# Patient Record
Sex: FEMALE | Race: WHITE | URBAN - METROPOLITAN AREA
[De-identification: names, ages, dates, MRNs, and addresses within clinical notes are randomized per-mention and may not be internally consistent; named-entity substitution may affect disease eponyms.]

---

## 2018-06-14 ENCOUNTER — OFFICE VISIT (OUTPATIENT)
Dept: URBAN - METROPOLITAN AREA CLINIC 81 | Facility: CLINIC | Age: 63
End: 2018-06-14
Payer: COMMERCIAL

## 2018-06-14 DIAGNOSIS — H52.13 MYOPIA, BILATERAL: Primary | ICD-10-CM

## 2018-06-14 DIAGNOSIS — H40.013 OPEN ANGLE WITH BORDERLINE FINDINGS, LOW RISK, BILATERAL: ICD-10-CM

## 2018-06-14 PROCEDURE — 92015 DETERMINE REFRACTIVE STATE: CPT | Performed by: OPTOMETRIST

## 2018-06-14 PROCEDURE — 92012 INTRM OPH EXAM EST PATIENT: CPT | Performed by: OPTOMETRIST

## 2018-06-14 ASSESSMENT — VISUAL ACUITY
OS: 20/50
OD: 20/50

## 2018-06-14 ASSESSMENT — KERATOMETRY
OS: 42.88
OD: 43.38

## 2018-06-14 NOTE — IMPRESSION/PLAN
Impression: Myopia, bilateral: H52.13. Plan: Discussed Dx with pt.  Rec hold off on new Rx pending results of Cat eval. Although pt dose want supply of CL's to hold until cat consult

## 2018-06-14 NOTE — IMPRESSION/PLAN
Impression: Open angle with borderline findings, low risk, bilateral: H40.013. Large c/d Plan: Continue to monitor. Baseline tests after cat consult/surgery.

## 2018-06-14 NOTE — IMPRESSION/PLAN
Impression: Age-related nuclear cataract, bilateral: H25.13. Plan: Discussed diagnosis & treatment options with patient. Hold off on new gls, pt aware new glasses will not significantly improve vision. Pt understands and wishes to proceed with surgery. Pt needs DE. Recommend CEIOL OD then OS. STANDARD IOL. Target: Distance. RL2. Schedule ASCAN then Pre Op with Dr. Octavia Blair.

## 2019-08-09 ENCOUNTER — OFFICE VISIT (OUTPATIENT)
Dept: URBAN - METROPOLITAN AREA CLINIC 76 | Facility: CLINIC | Age: 64
End: 2019-08-09
Payer: COMMERCIAL

## 2019-08-09 PROCEDURE — 92310 CONTACT LENS FITTING OU: CPT | Performed by: OPTOMETRIST

## 2019-08-09 PROCEDURE — 92014 COMPRE OPH EXAM EST PT 1/>: CPT | Performed by: OPTOMETRIST

## 2019-08-09 ASSESSMENT — VISUAL ACUITY
OD: 20/70-
OS: 20/50+

## 2019-08-09 ASSESSMENT — INTRAOCULAR PRESSURE
OD: 14
OS: 16

## 2019-08-09 ASSESSMENT — KERATOMETRY
OD: 43.50
OS: 43.13

## 2019-08-09 NOTE — IMPRESSION/PLAN
Impression: Open angle with borderline findings, low risk, bilateral: H40.013. Plan: Discussed diagnosis in detail with patient. Recommend baseline glaucoma testing. VF, OCT, P[place].

## 2019-08-09 NOTE — IMPRESSION/PLAN
Impression: Age-related nuclear cataract, bilateral: H25.13. Plan: Cataracts account for the patient's complaints. Patient understands changing glasses will not improve vision.   Recommend perla sanchez/ Dr. Octavia Blair for cataract surgery, after glc testing is done

## 2019-08-09 NOTE — IMPRESSION/PLAN
Impression: Presbyopia: H52.4. Plan: Discussed diagnosis with patient. Dispensed new MRx today. Order ctls trials. If pt happy with comfort and vision w/ ctls, ok to finalize. Pt to call with any concerns.

## 2019-09-20 ENCOUNTER — OFFICE VISIT (OUTPATIENT)
Dept: URBAN - METROPOLITAN AREA CLINIC 76 | Facility: CLINIC | Age: 64
End: 2019-09-20
Payer: COMMERCIAL

## 2019-09-20 PROCEDURE — 99213 OFFICE O/P EST LOW 20 MIN: CPT | Performed by: OPTOMETRIST

## 2019-09-20 PROCEDURE — 92083 EXTENDED VISUAL FIELD XM: CPT | Performed by: OPTOMETRIST

## 2019-09-20 PROCEDURE — 76514 ECHO EXAM OF EYE THICKNESS: CPT | Performed by: OPTOMETRIST

## 2019-09-20 PROCEDURE — 92133 CPTRZD OPH DX IMG PST SGM ON: CPT | Performed by: OPTOMETRIST

## 2019-09-20 RX ORDER — LATANOPROST 50 UG/ML
0.005 % SOLUTION OPHTHALMIC
Qty: 7.5 | Refills: 3 | Status: INACTIVE
Start: 2019-09-20 | End: 2019-10-04

## 2019-09-20 ASSESSMENT — INTRAOCULAR PRESSURE
OD: 14
OS: 14

## 2019-09-20 NOTE — IMPRESSION/PLAN
Impression: Primary open-angle glaucoma, bilateral, mild stage: H40.1131. VF: Normal OU 
OCT: Mod NFL thinning OU Plan: Discussed dx and test results in detail with patient. Recommend starting Latanoprost QHS OU.

## 2019-09-20 NOTE — IMPRESSION/PLAN
Impression: Age-related nuclear cataract, bilateral: H25.13. Plan: Cataracts account for the patient's complaints. Patient understands changing glasses will not improve vision.   Recommend pelra sanchez/ Dr. Fernie Stanton (per patients request)  for cataract surgery, after IOP controlled

## 2019-10-04 ENCOUNTER — OFFICE VISIT (OUTPATIENT)
Dept: URBAN - METROPOLITAN AREA CLINIC 76 | Facility: CLINIC | Age: 64
End: 2019-10-04
Payer: COMMERCIAL

## 2019-10-04 DIAGNOSIS — H25.13 AGE-RELATED NUCLEAR CATARACT, BILATERAL: ICD-10-CM

## 2019-10-04 DIAGNOSIS — H40.1131 PRIMARY OPEN-ANGLE GLAUCOMA, BILATERAL, MILD STAGE: Primary | ICD-10-CM

## 2019-10-04 PROCEDURE — 99213 OFFICE O/P EST LOW 20 MIN: CPT | Performed by: OPTOMETRIST

## 2019-10-04 RX ORDER — LATANOPROST 50 UG/ML
0.005 % SOLUTION OPHTHALMIC
Qty: 7.5 | Refills: 3 | Status: INACTIVE
Start: 2019-10-04 | End: 2020-07-24

## 2019-10-04 ASSESSMENT — INTRAOCULAR PRESSURE
OS: 13
OD: 13

## 2019-10-04 NOTE — IMPRESSION/PLAN
Impression: Age-related nuclear cataract, bilateral: H25.13. Plan: Cataracts account for the patient's complaints. Patient understands changing glasses will not improve vision.   Recommend perla sanchez/ Dr. Nelly Malone for cataract surgery, per patient request.

## 2019-10-04 NOTE — IMPRESSION/PLAN
Impression: Primary open-angle glaucoma, bilateral, mild stage: H40.1131. VF: Normal OU 
OCT: Mod NFL thinning OU Plan: Discussed dx in detail with patient. Continue Latanoprost QHS OU.  Continue to monitor

## 2019-10-30 ENCOUNTER — NEW PATIENT (OUTPATIENT)
Dept: URBAN - METROPOLITAN AREA CLINIC 80 | Facility: CLINIC | Age: 64
End: 2019-10-30
Payer: COMMERCIAL

## 2019-10-30 DIAGNOSIS — H16.223 KERATOCONJUNCTIVITIS SICCA, BILATERAL: ICD-10-CM

## 2019-10-30 DIAGNOSIS — H25.813 COMBINED FORMS OF AGE-RELATED CATARACT, BILATERAL: Primary | ICD-10-CM

## 2019-10-30 DIAGNOSIS — H52.223 REGULAR ASTIGMATISM, BILATERAL: ICD-10-CM

## 2019-10-30 PROCEDURE — 92004 COMPRE OPH EXAM NEW PT 1/>: CPT | Performed by: OPHTHALMOLOGY

## 2019-10-30 ASSESSMENT — VISUAL ACUITY
OS: 20/40
OD: 20/60

## 2019-10-30 ASSESSMENT — INTRAOCULAR PRESSURE
OS: 16
OD: 13

## 2019-11-26 ENCOUNTER — Encounter (OUTPATIENT)
Dept: URBAN - METROPOLITAN AREA CLINIC 64 | Facility: CLINIC | Age: 64
End: 2019-11-26
Payer: COMMERCIAL

## 2019-12-02 ENCOUNTER — Encounter (OUTPATIENT)
Dept: URBAN - METROPOLITAN AREA CLINIC 80 | Facility: CLINIC | Age: 64
End: 2019-12-02
Payer: COMMERCIAL

## 2019-12-02 PROCEDURE — 99213 OFFICE O/P EST LOW 20 MIN: CPT | Performed by: PHYSICIAN ASSISTANT

## 2019-12-05 ENCOUNTER — SURGERY (OUTPATIENT)
Dept: URBAN - METROPOLITAN AREA SURGERY 50 | Facility: SURGERY | Age: 64
End: 2019-12-05
Payer: COMMERCIAL

## 2019-12-05 PROCEDURE — 0191T INSERTION OF ANTERIOR SEGMENT AQUEOUS DRAINAGE DEVICE, W/OUT EXTRAOCULAR RESERVO: CPT | Performed by: OPHTHALMOLOGY

## 2019-12-06 ENCOUNTER — POST-OPERATIVE VISIT (OUTPATIENT)
Dept: URBAN - METROPOLITAN AREA CLINIC 76 | Facility: CLINIC | Age: 64
End: 2019-12-06
Payer: COMMERCIAL

## 2019-12-06 PROCEDURE — 99024 POSTOP FOLLOW-UP VISIT: CPT | Performed by: OPTOMETRIST

## 2019-12-06 ASSESSMENT — INTRAOCULAR PRESSURE: OD: 11

## 2019-12-13 ENCOUNTER — POST-OPERATIVE VISIT (OUTPATIENT)
Dept: URBAN - METROPOLITAN AREA CLINIC 76 | Facility: CLINIC | Age: 64
End: 2019-12-13
Payer: COMMERCIAL

## 2019-12-13 PROCEDURE — 99024 POSTOP FOLLOW-UP VISIT: CPT | Performed by: OPTOMETRIST

## 2019-12-13 ASSESSMENT — INTRAOCULAR PRESSURE: OD: 15

## 2019-12-13 ASSESSMENT — VISUAL ACUITY: OD: 20/25+

## 2020-01-13 ENCOUNTER — Encounter (OUTPATIENT)
Dept: URBAN - METROPOLITAN AREA CLINIC 80 | Facility: CLINIC | Age: 65
End: 2020-01-13
Payer: COMMERCIAL

## 2020-01-13 DIAGNOSIS — Z01.818 ENCOUNTER FOR OTHER PREPROCEDURAL EXAMINATION: Primary | ICD-10-CM

## 2020-01-13 PROCEDURE — 99213 OFFICE O/P EST LOW 20 MIN: CPT | Performed by: PHYSICIAN ASSISTANT

## 2020-01-16 ENCOUNTER — SURGERY (OUTPATIENT)
Dept: URBAN - METROPOLITAN AREA SURGERY 50 | Facility: SURGERY | Age: 65
End: 2020-01-16
Payer: COMMERCIAL

## 2020-01-16 PROCEDURE — 0191T INSERTION OF ANTERIOR SEGMENT AQUEOUS DRAINAGE DEVICE, W/OUT EXTRAOCULAR RESERVO: CPT | Performed by: OPHTHALMOLOGY

## 2020-01-17 ENCOUNTER — POST-OPERATIVE VISIT (OUTPATIENT)
Dept: URBAN - METROPOLITAN AREA CLINIC 76 | Facility: CLINIC | Age: 65
End: 2020-01-17
Payer: COMMERCIAL

## 2020-01-17 PROCEDURE — 99024 POSTOP FOLLOW-UP VISIT: CPT | Performed by: OPTOMETRIST

## 2020-01-17 ASSESSMENT — INTRAOCULAR PRESSURE
OS: 15
OD: 13

## 2020-01-24 ENCOUNTER — POST-OPERATIVE VISIT (OUTPATIENT)
Dept: URBAN - METROPOLITAN AREA CLINIC 76 | Facility: CLINIC | Age: 65
End: 2020-01-24
Payer: COMMERCIAL

## 2020-01-24 DIAGNOSIS — Z09 ENCNTR FOR F/U EXAM AFT TRTMT FOR COND OTH THAN MALIG NEOPLM: Primary | ICD-10-CM

## 2020-01-24 PROCEDURE — 99024 POSTOP FOLLOW-UP VISIT: CPT | Performed by: OPTOMETRIST

## 2020-01-24 ASSESSMENT — INTRAOCULAR PRESSURE
OD: 15
OS: 15

## 2020-01-24 ASSESSMENT — VISUAL ACUITY
OD: 20/25+
OS: 20/25-

## 2020-02-28 ENCOUNTER — POST-OPERATIVE VISIT (OUTPATIENT)
Dept: URBAN - METROPOLITAN AREA CLINIC 76 | Facility: CLINIC | Age: 65
End: 2020-02-28
Payer: COMMERCIAL

## 2020-02-28 DIAGNOSIS — Z96.1 PRESENCE OF INTRAOCULAR LENS: Primary | ICD-10-CM

## 2020-02-28 PROCEDURE — 99024 POSTOP FOLLOW-UP VISIT: CPT | Performed by: OPTOMETRIST

## 2020-02-28 ASSESSMENT — INTRAOCULAR PRESSURE
OS: 15
OD: 15

## 2020-02-28 NOTE — IMPRESSION/PLAN
Impression: S/P CE/IOL LRI ORA OMNI OS - 43 Days. Presence of intraocular lens  Z96.1. Plan: Continue to monitor IOP without treatment at this time. New MRX given today. --Advised patient to use artificial tears for comfort.

## 2020-07-24 ENCOUNTER — OFFICE VISIT (OUTPATIENT)
Dept: URBAN - METROPOLITAN AREA CLINIC 76 | Facility: CLINIC | Age: 65
End: 2020-07-24
Payer: COMMERCIAL

## 2020-07-24 PROCEDURE — 99213 OFFICE O/P EST LOW 20 MIN: CPT | Performed by: OPTOMETRIST

## 2020-07-24 ASSESSMENT — INTRAOCULAR PRESSURE
OD: 14
OS: 15

## 2020-07-24 NOTE — IMPRESSION/PLAN
Impression: Primary open-angle glaucoma, bilateral, mild stage: H40.1131. Plan: Discussed dx in detail with patient. Continue with no treatment at this time. Testing due in September.

## 2021-12-03 ENCOUNTER — OFFICE VISIT (OUTPATIENT)
Dept: URBAN - METROPOLITAN AREA CLINIC 76 | Facility: CLINIC | Age: 66
End: 2021-12-03
Payer: COMMERCIAL

## 2021-12-03 DIAGNOSIS — H52.4 PRESBYOPIA: Primary | ICD-10-CM

## 2021-12-03 PROCEDURE — 92012 INTRM OPH EXAM EST PATIENT: CPT | Performed by: OPTOMETRIST

## 2021-12-03 ASSESSMENT — VISUAL ACUITY
OD: 20/25
OS: 20/20

## 2021-12-03 ASSESSMENT — KERATOMETRY
OS: 43.00
OD: 43.63

## 2021-12-03 ASSESSMENT — INTRAOCULAR PRESSURE
OS: 15
OD: 14

## 2021-12-03 NOTE — IMPRESSION/PLAN
Impression: Primary open-angle glaucoma, bilateral, mild stage: H40.1131. Pt declined dilation and did not return when advised. + Martita 83 (mother). OMNI OS & iStent OD w/ CE. Plan: Discussed dx in detail with patient. Continue with no treatment at this time, advised of vision loss without proper monitoring.

## 2022-10-25 ENCOUNTER — OFFICE VISIT (OUTPATIENT)
Dept: URBAN - METROPOLITAN AREA CLINIC 76 | Facility: CLINIC | Age: 67
End: 2022-10-25
Payer: MEDICARE

## 2022-10-25 DIAGNOSIS — H26.493 OTHER SECONDARY CATARACT, BILATERAL: Primary | ICD-10-CM

## 2022-10-25 DIAGNOSIS — H40.1131 PRIMARY OPEN-ANGLE GLAUCOMA, BILATERAL, MILD STAGE: ICD-10-CM

## 2022-10-25 PROCEDURE — 92014 COMPRE OPH EXAM EST PT 1/>: CPT | Performed by: OPTOMETRIST

## 2022-10-25 ASSESSMENT — KERATOMETRY
OS: 43.13
OD: 43.88

## 2022-10-25 ASSESSMENT — VISUAL ACUITY
OS: 20/30
OD: 20/30

## 2022-10-25 ASSESSMENT — INTRAOCULAR PRESSURE
OS: 14
OD: 14

## 2022-10-25 NOTE — IMPRESSION/PLAN
Impression: Primary open-angle glaucoma, bilateral, mild stage: H40.1131. pt was taking latanoprost. Stopped after surgery. Also had glc procedures with cataract surgeries OU but neither drops nor procedures seemed to lower IOP. Last testing done 2019 Plan: GLC: Discussed condition in detail. Further testing needed. Schedule OCT of optic nerve, Visual Field 24-2, IOP next available.

## 2022-10-25 NOTE — IMPRESSION/PLAN
Impression: Other secondary cataract, bilateral: H26.493. Plan: Accounts for some of the patient's complaints. Patient understands changing glasses will not significantly improve vision. Patient willing to have YAG Capsulotomy. Refer to cataract surgeon for YAG Capsulotomy.

## 2022-11-08 ENCOUNTER — OFFICE VISIT (OUTPATIENT)
Dept: URBAN - METROPOLITAN AREA CLINIC 76 | Facility: CLINIC | Age: 67
End: 2022-11-08
Payer: MEDICARE

## 2022-11-08 DIAGNOSIS — Z96.1 PRESENCE OF INTRAOCULAR LENS: ICD-10-CM

## 2022-11-08 DIAGNOSIS — H26.493 OTHER SECONDARY CATARACT, BILATERAL: Primary | ICD-10-CM

## 2022-11-08 DIAGNOSIS — H40.1131 PRIMARY OPEN-ANGLE GLAUCOMA, BILATERAL, MILD STAGE: ICD-10-CM

## 2022-11-08 PROCEDURE — 99204 OFFICE O/P NEW MOD 45 MIN: CPT | Performed by: STUDENT IN AN ORGANIZED HEALTH CARE EDUCATION/TRAINING PROGRAM

## 2022-11-08 ASSESSMENT — INTRAOCULAR PRESSURE
OS: 15
OD: 15

## 2022-11-08 ASSESSMENT — VISUAL ACUITY
OD: 20/30
OS: 20/30

## 2022-11-08 ASSESSMENT — KERATOMETRY
OD: 43.75
OS: 43.25

## 2022-11-08 NOTE — IMPRESSION/PLAN
Impression: Primary open-angle glaucoma, bilateral, mild stage: H40.1131. Plan: Continue to monitor closely with Dr. Lenard Siddiqui.

## 2022-11-08 NOTE — IMPRESSION/PLAN
Impression: Other secondary cataract, bilateral: H26.493. Plan: Discussed condition in detail. Recommend YAG. Discussed procedure, risks, and benefits with patient. Advised treatment can increase floaters that may be permanent, pt understands. Patient elects to proceed. Schedule YAG OS then OD.

## 2022-12-12 ENCOUNTER — POST-OPERATIVE VISIT (OUTPATIENT)
Dept: URBAN - METROPOLITAN AREA CLINIC 76 | Facility: CLINIC | Age: 67
End: 2022-12-12
Payer: MEDICARE

## 2022-12-12 DIAGNOSIS — Z48.810 ENCOUNTER FOR SURGICAL AFTERCARE FOLLOWING SURGERY ON A SENSE ORGAN: Primary | ICD-10-CM

## 2022-12-12 PROCEDURE — 99024 POSTOP FOLLOW-UP VISIT: CPT | Performed by: OPTOMETRIST

## 2022-12-12 ASSESSMENT — INTRAOCULAR PRESSURE
OS: 18
OD: 15

## 2022-12-12 NOTE — IMPRESSION/PLAN
Impression: S/P YAG Capsulotomy (Yttrium Aluminum [place]) OS - 7 Days. Encounter for surgical aftercare following surgery on a sense organ  Z48.810. Plan: Discussed. Recommend using AT's 3-4x a day for comfort. Pt to call with any concerns.

## 2023-01-16 ENCOUNTER — SURGERY (OUTPATIENT)
Dept: URBAN - METROPOLITAN AREA SURGERY 47 | Facility: SURGERY | Age: 68
End: 2023-01-16
Payer: MEDICARE

## 2023-01-16 PROCEDURE — 66821 AFTER CATARACT LASER SURGERY: CPT | Performed by: STUDENT IN AN ORGANIZED HEALTH CARE EDUCATION/TRAINING PROGRAM

## 2023-01-30 ENCOUNTER — POST-OPERATIVE VISIT (OUTPATIENT)
Dept: URBAN - METROPOLITAN AREA CLINIC 76 | Facility: CLINIC | Age: 68
End: 2023-01-30
Payer: MEDICARE

## 2023-01-30 DIAGNOSIS — H52.4 PRESBYOPIA: Primary | ICD-10-CM

## 2023-01-30 DIAGNOSIS — Z48.810 ENCOUNTER FOR SURGICAL AFTERCARE FOLLOWING SURGERY ON A SENSE ORGAN: ICD-10-CM

## 2023-01-30 PROCEDURE — 99024 POSTOP FOLLOW-UP VISIT: CPT | Performed by: OPTOMETRIST

## 2023-01-30 ASSESSMENT — VISUAL ACUITY
OD: 20/20
OS: 20/20

## 2023-01-30 ASSESSMENT — INTRAOCULAR PRESSURE
OD: 15
OS: 14

## 2023-01-30 NOTE — IMPRESSION/PLAN
Impression: S/P YAG Capsulotomy (Yttrium Aluminum [place]) OD - 14 Days. Encounter for surgical aftercare following surgery on a sense organ  Z48.810. Post operative instructions reviewed - Plan: Discussed. Recommend using AT's 3-4x a day for comfort. New MRx given today. Pt to call with any concerns.